# Patient Record
Sex: FEMALE | URBAN - METROPOLITAN AREA
[De-identification: names, ages, dates, MRNs, and addresses within clinical notes are randomized per-mention and may not be internally consistent; named-entity substitution may affect disease eponyms.]

---

## 2018-05-23 ENCOUNTER — APPOINTMENT (RX ONLY)
Dept: URBAN - METROPOLITAN AREA CLINIC 86 | Facility: CLINIC | Age: 43
Setting detail: DERMATOLOGY
End: 2018-05-23

## 2018-05-23 DIAGNOSIS — D22 MELANOCYTIC NEVI: ICD-10-CM

## 2018-05-23 DIAGNOSIS — L20.89 OTHER ATOPIC DERMATITIS: ICD-10-CM

## 2018-05-23 PROBLEM — D48.5 NEOPLASM OF UNCERTAIN BEHAVIOR OF SKIN: Status: ACTIVE | Noted: 2018-05-23

## 2018-05-23 PROBLEM — L20.84 INTRINSIC (ALLERGIC) ECZEMA: Status: ACTIVE | Noted: 2018-05-23

## 2018-05-23 PROCEDURE — 99203 OFFICE O/P NEW LOW 30 MIN: CPT | Mod: 25

## 2018-05-23 PROCEDURE — ? PRESCRIPTION

## 2018-05-23 PROCEDURE — ? SHAVE REMOVAL

## 2018-05-23 PROCEDURE — ? COUNSELING

## 2018-05-23 PROCEDURE — 11306 SHAVE SKIN LESION 0.6-1.0 CM: CPT

## 2018-05-23 RX ORDER — CLOBETASOL PROPIONATE 0.5 MG/G
AEROSOL, FOAM TOPICAL BID
Qty: 1 | Refills: 0 | Status: ERX | COMMUNITY
Start: 2018-05-23

## 2018-05-23 RX ADMIN — CLOBETASOL PROPIONATE: 0.5 AEROSOL, FOAM TOPICAL at 20:55

## 2018-05-23 ASSESSMENT — LOCATION SIMPLE DESCRIPTION DERM: LOCATION SIMPLE: LEFT ANTERIOR NECK

## 2018-05-23 ASSESSMENT — LOCATION DETAILED DESCRIPTION DERM: LOCATION DETAILED: LEFT INFERIOR ANTERIOR NECK

## 2018-05-23 ASSESSMENT — LOCATION ZONE DERM: LOCATION ZONE: NECK

## 2018-05-23 NOTE — PROCEDURE: SHAVE REMOVAL
Consent was obtained from the patient. The risks and benefits to therapy were discussed in detail. Specifically, the risks of infection, scarring, bleeding, prolonged wound healing, incomplete removal, allergy to anesthesia, nerve injury and recurrence were addressed. Prior to the procedure, the treatment site was clearly identified and confirmed by the patient. All components of Universal Protocol/PAUSE Rule completed.
Bill 11172 For Specimen Handling/Conveyance To Laboratory?: no
Notification Instructions: Patient will be notified of biopsy results. However, patient instructed to call the office if not contacted within 2 weeks.
Anesthesia Type: 1% lidocaine with epinephrine
Size Of Lesion In Cm (Required): 0.6
Render Post-Care Instructions In Note?: yes
X Size Of Lesion In Cm (Optional): 0
Medical Necessity Information: It is in your best interest to select a reason for this procedure from the list below. All of these items fulfill various CMS LCD requirements except the new and changing color options.
Path Notes (To The Dermatopathologist): Size: 0.6
Lab: [id number]  [facility]
Size Of Margin In Cm (Margins Are Not Added To Billing Dimensions): -
Billing Type: [employer]
Lab Facility:  [facility]
Biopsy Method: Personna blade
Post-Care Instructions: I reviewed with the patient in detail post-care instructions. Patient is to keep the biopsy site dry overnight, and then apply bacitracin twice daily until healed.
Hemostasis: Electrocautery
Detail Level: Detailed
Medical Necessity Clause: This procedure was medically necessary because the lesion that was treated was:
Wound Care: Bacitracin
Anesthesia Volume In Cc: 0.3
Body Location Override (Optional - Billing Will Still Be Based On Selected Body Map Location If Applicable): central neck

## 2018-05-23 NOTE — PROCEDURE: MIPS QUALITY
Detail Level: Zone
Quality 431: Preventive Care And Screening: Unhealthy Alcohol Use - Screening: Patient screened for unhealthy alcohol use using a single question and scores less than 2 times per year
Quality 131: Pain Assessment And Follow-Up: No documentation of pain assessment, reason not given
Quality 110: Preventive Care And Screening: Influenza Immunization: Influenza immunization was not ordered or administered, reason not given
Quality 47: Advance Care Plan: Advance care planning not documented, reason not otherwise specified.
Quality 130: Documentation Of Current Medications In The Medical Record: Current Medications Documented
Quality 226: Preventive Care And Screening: Tobacco Use: Screening And Cessation Intervention: Patient screened for tobacco and never smoked

## 2020-11-12 ENCOUNTER — APPOINTMENT (RX ONLY)
Dept: URBAN - METROPOLITAN AREA CLINIC 55 | Facility: CLINIC | Age: 45
Setting detail: DERMATOLOGY
End: 2020-11-12

## 2020-11-12 DIAGNOSIS — Z41.9 ENCOUNTER FOR PROCEDURE FOR PURPOSES OTHER THAN REMEDYING HEALTH STATE, UNSPECIFIED: ICD-10-CM

## 2020-11-12 PROCEDURE — ? DYSPORT

## 2020-11-12 NOTE — PROCEDURE: DYSPORT
Show Topical Anesthesia: Yes
Consent: Written consent obtained. Risks include but not limited to lid/brow ptosis, bruising, swelling, diplopia, temporary effect, incomplete chemical denervation.
Post-Care Instructions: Patient instructed to not lie down for 4 hours and limit physical activity for 24 hours. Follow up 7 days if needed.
Right Periorbital Skin Units: 0
Dilution (U/0.1 Cc): 10
Show Right And Left Brow Units: No
Price (Use Numbers Only, No Special Characters Or $): 384
Lot #: [id number]
Additional Area 1 Location: lip
Detail Level: Detailed
Periorbital Skin Units: 30
Glabellar Complex Units: 25
Expiration Date (Month Year): 6/30/2021
Forehead Units: 17.5

## 2021-04-01 ENCOUNTER — APPOINTMENT (RX ONLY)
Dept: URBAN - METROPOLITAN AREA CLINIC 55 | Facility: CLINIC | Age: 46
Setting detail: DERMATOLOGY
End: 2021-04-01

## 2021-04-01 DIAGNOSIS — Z41.9 ENCOUNTER FOR PROCEDURE FOR PURPOSES OTHER THAN REMEDYING HEALTH STATE, UNSPECIFIED: ICD-10-CM

## 2021-04-01 PROCEDURE — ? DYSPORT

## 2021-04-01 NOTE — PROCEDURE: DYSPORT
Show Mentalis Units: No
Additional Area 4 Units: 0
Expiration Date (Month Year): 11/30/2021
Show Masseter Units: Yes
Glabellar Complex Units: 25
Detail Level: Detailed
Post-Care Instructions: Patient instructed to not lie down for 4 hours and limit physical activity for 24 hours. Follow up 7 days if needed.
Additional Area 1 Location: lip
Forehead Units: 20
Periorbital Skin Units: 30
Lot #: [id number]
Consent: Written consent obtained. Risks include but not limited to lid/brow ptosis, bruising, swelling, diplopia, temporary effect, incomplete chemical denervation.
Dilution (U/0.1 Cc): 10

## 2021-07-14 ENCOUNTER — APPOINTMENT (RX ONLY)
Dept: URBAN - METROPOLITAN AREA CLINIC 55 | Facility: CLINIC | Age: 46
Setting detail: DERMATOLOGY
End: 2021-07-14

## 2021-07-14 DIAGNOSIS — Z41.9 ENCOUNTER FOR PROCEDURE FOR PURPOSES OTHER THAN REMEDYING HEALTH STATE, UNSPECIFIED: ICD-10-CM

## 2021-07-14 PROCEDURE — ? DYSPORT

## 2021-07-14 NOTE — PROCEDURE: DYSPORT
Glabellar Complex Units: 25
Show Right And Left Periorbital Units: No
Levator Labii Superioris Units: 0
Price (Use Numbers Only, No Special Characters Or $): 240
Show Levator Superior Units: Yes
Periorbital Skin Units: 30
Post-Care Instructions: Patient instructed to not lie down for 4 hours and limit physical activity for 24 hours. Follow up 7 days if needed.
Consent: Written consent obtained. Risks include but not limited to lid/brow ptosis, bruising, swelling, diplopia, temporary effect, incomplete chemical denervation.
Forehead Units: 20
Dilution (U/0.1 Cc): 10
Expiration Date (Month Year): 9/30/2021
Additional Area 1 Location: lip
Lot #: [id number]
Detail Level: Detailed

## 2021-08-05 ENCOUNTER — APPOINTMENT (RX ONLY)
Dept: URBAN - METROPOLITAN AREA CLINIC 55 | Facility: CLINIC | Age: 46
Setting detail: DERMATOLOGY
End: 2021-08-05

## 2021-08-05 DIAGNOSIS — Z41.9 ENCOUNTER FOR PROCEDURE FOR PURPOSES OTHER THAN REMEDYING HEALTH STATE, UNSPECIFIED: ICD-10-CM

## 2021-08-05 PROCEDURE — ? DYSPORT

## 2021-08-05 PROCEDURE — ? FILLERS

## 2021-08-05 ASSESSMENT — LOCATION ZONE DERM: LOCATION ZONE: FACE

## 2021-08-05 ASSESSMENT — LOCATION SIMPLE DESCRIPTION DERM: LOCATION SIMPLE: RIGHT CHEEK

## 2021-08-05 ASSESSMENT — LOCATION DETAILED DESCRIPTION DERM: LOCATION DETAILED: RIGHT SUPERIOR CENTRAL MALAR CHEEK

## 2021-08-05 NOTE — PROCEDURE: FILLERS
Cheeks Filler Volume In Cc: 0
Anesthesia Volume In Cc: 0.3
Filler: Restylane-L
Expiration Date (Month Year): 01/31/2024
Anesthesia Type: 1% lidocaine with epinephrine
Lot #: [id number]
Include Cannula Information In Note?: No
Additional Anesthesia Volume In Cc: 6
Consent: Written consent obtained. Risks include but not limited to bruising, beading, irregular texture, ulceration, infection, allergic reaction, scar formation, incomplete augmentation, temporary nature, procedural pain.
Post-Care Instructions: Patient instructed to apply ice to reduce swelling. Post care handout given to patient.
Expiration Date (Month Year): 10/31/2023
Price (Use Numbers Only, No Special Characters Or $): 106
Filler Comments: .5cc used.
Lot #: [id number]
Map Statment: See Attach Map for Complete Details
Include Cannula Size?: 27G
Expiration Date (Month Year): 10/31/2022
Lot #: [id number]
Detail Level: Detailed

## 2021-08-05 NOTE — PROCEDURE: DYSPORT
Dilution (U/0.1 Cc): 10
Consent: Written consent obtained. Risks include but not limited to lid/brow ptosis, bruising, swelling, diplopia, temporary effect, incomplete chemical denervation.
Show Nasal Units: Yes
Lateral Platysmal Bands Units: 0
Additional Area 1 Units: 5
Lot #: [id number]
Show Mentalis Units: No
Forehead Units: 15
Expiration Date (Month Year): 9/30/2021
Detail Level: Detailed
Post-Care Instructions: Patient instructed to not lie down for 4 hours and limit physical activity for 24 hours. Follow up 7 days if needed.
Additional Area 1 Location: under eyes
Price (Use Numbers Only, No Special Characters Or $): 240

## 2021-11-17 ENCOUNTER — APPOINTMENT (RX ONLY)
Dept: URBAN - METROPOLITAN AREA CLINIC 55 | Facility: CLINIC | Age: 46
Setting detail: DERMATOLOGY
End: 2021-11-17

## 2021-11-17 DIAGNOSIS — Z41.9 ENCOUNTER FOR PROCEDURE FOR PURPOSES OTHER THAN REMEDYING HEALTH STATE, UNSPECIFIED: ICD-10-CM

## 2021-11-17 PROCEDURE — ? DYSPORT

## 2021-11-17 NOTE — PROCEDURE: DYSPORT
Masseter Units: 0
Show Nasal Units: Yes
Post-Care Instructions: Patient instructed to not lie down for 4 hours and limit physical activity for 24 hours. Follow up 7 days if needed.
Show Right And Left Periorbital Units: No
Dilution (U/0.1 Cc): 10
Detail Level: Detailed
Price (Use Numbers Only, No Special Characters Or $): 408
Lot #: [id number]
Glabellar Complex Units: 25
Periorbital Skin Units: 30
Additional Area 1 Location: lip
Expiration Date (Month Year): 7/31/2022
Consent: Written consent obtained. Risks include but not limited to lid/brow ptosis, bruising, swelling, diplopia, temporary effect, incomplete chemical denervation.
no

## 2022-03-03 ENCOUNTER — APPOINTMENT (RX ONLY)
Dept: URBAN - METROPOLITAN AREA CLINIC 55 | Facility: CLINIC | Age: 47
Setting detail: DERMATOLOGY
End: 2022-03-03

## 2022-03-03 DIAGNOSIS — Z41.9 ENCOUNTER FOR PROCEDURE FOR PURPOSES OTHER THAN REMEDYING HEALTH STATE, UNSPECIFIED: ICD-10-CM

## 2022-03-03 PROCEDURE — ? DYSPORT

## 2022-03-03 NOTE — PROCEDURE: DYSPORT
Right Pupillary Line Units: 0
Dilution (U/0.1 Cc): 10
Show Orbicularis Oculi Units: Yes
Consent: Written consent obtained. Risks include but not limited to lid/brow ptosis, bruising, swelling, diplopia, temporary effect, incomplete chemical denervation.
Post-Care Instructions: Patient instructed to not lie down for 4 hours and limit physical activity for 24 hours. Follow up 7 days if needed.
Show Lcl Units: No
Additional Area 1 Location: lip
Detail Level: Detailed
Forehead Units: 35
Lot #: [id number]
Glabellar Complex Units: 25
Expiration Date (Month Year): 8/30/2022
Periorbital Skin Units: 30

## 2022-06-29 ENCOUNTER — APPOINTMENT (RX ONLY)
Dept: URBAN - METROPOLITAN AREA CLINIC 55 | Facility: CLINIC | Age: 47
Setting detail: DERMATOLOGY
End: 2022-06-29

## 2022-06-29 DIAGNOSIS — Z41.9 ENCOUNTER FOR PROCEDURE FOR PURPOSES OTHER THAN REMEDYING HEALTH STATE, UNSPECIFIED: ICD-10-CM

## 2022-06-29 PROCEDURE — ? DYSPORT

## 2022-06-29 NOTE — PROCEDURE: DYSPORT
Show Additional Area 4: Yes
Show Right And Left Periorbital Units: No
Inferior Lateral Orbicularis Oculi Units: 0
Detail Level: Detailed
Post-Care Instructions: Patient instructed to not lie down for 4 hours and limit physical activity for 24 hours. Follow up 7 days if needed.
Dilution (U/0.1 Cc): 10
Consent: Written consent obtained. Risks include but not limited to lid/brow ptosis, bruising, swelling, diplopia, temporary effect, incomplete chemical denervation.
Lot #: [id number]
Glabellar Complex Units: 25
Expiration Date (Month Year): 2/28/2023
Forehead Units: 40
Additional Area 1 Location: lip
Periorbital Skin Units: 30

## 2022-11-10 ENCOUNTER — APPOINTMENT (RX ONLY)
Dept: URBAN - METROPOLITAN AREA CLINIC 55 | Facility: CLINIC | Age: 47
Setting detail: DERMATOLOGY
End: 2022-11-10

## 2022-11-10 DIAGNOSIS — Z41.9 ENCOUNTER FOR PROCEDURE FOR PURPOSES OTHER THAN REMEDYING HEALTH STATE, UNSPECIFIED: ICD-10-CM

## 2022-11-10 PROCEDURE — ? DYSPORT

## 2022-11-10 NOTE — PROCEDURE: DYSPORT
Show Masseter Units: Yes
Lcl Root Units: 0
Post-Care Instructions: Patient instructed to not lie down flat for 4 hours or rub the treatment area.
Show Right And Left Periorbital Units: No
Glabellar Complex Units: 25
Detail Level: Detailed
Additional Area 1 Location: upper cutaneous
Forehead Units: 40
Show Inventory Tab: Show
Dilution (U/0.1 Cc): 10
Periorbital Skin Units: 30
Consent obtained and risk reviewed.

## 2023-06-14 ENCOUNTER — APPOINTMENT (RX ONLY)
Dept: URBAN - METROPOLITAN AREA CLINIC 55 | Facility: CLINIC | Age: 48
Setting detail: DERMATOLOGY
End: 2023-06-14

## 2023-06-14 DIAGNOSIS — Z41.9 ENCOUNTER FOR PROCEDURE FOR PURPOSES OTHER THAN REMEDYING HEALTH STATE, UNSPECIFIED: ICD-10-CM

## 2023-06-14 PROCEDURE — ? DYSPORT

## 2023-06-14 NOTE — PROCEDURE: DYSPORT
L Brow Units: 0
Show Periorbital Units: Yes
Additional Area 2 Location: [facility]
Show Right And Left Periorbital Units: No
Post-Care Instructions: Patient instructed to not lie down flat for 4 hours or rub the treatment area.
Consent obtained and risk reviewed.
Show Inventory Tab: Show
Periorbital Skin Units: 30
Dilution (U/0.1 Cc): 10
Glabellar Complex Units: 40
Additional Area 1 Location: upper cutaneous
Forehead Units: 35
Detail Level: Detailed

## 2023-11-07 ENCOUNTER — APPOINTMENT (RX ONLY)
Dept: URBAN - METROPOLITAN AREA CLINIC 55 | Facility: CLINIC | Age: 48
Setting detail: DERMATOLOGY
End: 2023-11-07

## 2023-11-07 DIAGNOSIS — Z41.9 ENCOUNTER FOR PROCEDURE FOR PURPOSES OTHER THAN REMEDYING HEALTH STATE, UNSPECIFIED: ICD-10-CM

## 2023-11-07 PROCEDURE — ? DYSPORT

## 2023-11-07 NOTE — PROCEDURE: DYSPORT
Show Ucl Units: No
Left Periorbital Skin Units: 0
Show Forehead Units: Yes
Forehead Units: 35
Additional Area 1 Location: upper cutaneous
Glabellar Complex Units: 40
Detail Level: Detailed
Additional Area 2 Location: chin
Consent obtained and risk reviewed.
Show Inventory Tab: Show
Post-Care Instructions: Patient instructed to not lie down flat for 4 hours or rub the treatment area.
Periorbital Skin Units: 30
Dilution (U/0.1 Cc): 10

## 2024-02-22 ENCOUNTER — APPOINTMENT (RX ONLY)
Dept: URBAN - METROPOLITAN AREA CLINIC 55 | Facility: CLINIC | Age: 49
Setting detail: DERMATOLOGY
End: 2024-02-22

## 2024-02-22 DIAGNOSIS — Z41.9 ENCOUNTER FOR PROCEDURE FOR PURPOSES OTHER THAN REMEDYING HEALTH STATE, UNSPECIFIED: ICD-10-CM

## 2024-02-22 PROCEDURE — ? DYSPORT

## 2024-02-22 NOTE — PROCEDURE: DYSPORT
Show Nasal Units: Yes
Additional Area 1 Units: 0
Show Inventory Tab: Show
Additional Area 2 Location: chin
Consent obtained and risk reviewed.
Show Ucl Units: No
Post-Care Instructions: Patient instructed to not lie down flat for 4 hours or rub the treatment area.
Periorbital Skin Units: 40
Dilution (U/0.1 Cc): 10
Forehead Units: 35
Additional Area 1 Location: upper cutaneous
Detail Level: Detailed

## 2024-06-13 ENCOUNTER — APPOINTMENT (RX ONLY)
Dept: URBAN - METROPOLITAN AREA CLINIC 55 | Facility: CLINIC | Age: 49
Setting detail: DERMATOLOGY
End: 2024-06-13

## 2024-06-13 DIAGNOSIS — Z41.9 ENCOUNTER FOR PROCEDURE FOR PURPOSES OTHER THAN REMEDYING HEALTH STATE, UNSPECIFIED: ICD-10-CM

## 2024-06-13 PROCEDURE — ? DYSPORT

## 2024-06-13 NOTE — PROCEDURE: DYSPORT
Mentalis Units: 0
Periorbital Skin Units: 30
Show Additional Area 3: Yes
Additional Area 2 Location: chin
Show Ucl Units: No
Consent obtained and risk reviewed.
Post-Care Instructions: Patient instructed to not lie down flat for 4 hours or rub the treatment area.
Forehead Units: 35
Dilution (U/0.1 Cc): 10
Detail Level: Detailed
Additional Area 1 Location: upper cutaneous
Glabellar Complex Units: 40
Show Inventory Tab: Show

## 2024-10-09 ENCOUNTER — APPOINTMENT (RX ONLY)
Dept: URBAN - METROPOLITAN AREA CLINIC 55 | Facility: CLINIC | Age: 49
Setting detail: DERMATOLOGY
End: 2024-10-09

## 2024-10-09 DIAGNOSIS — Z41.9 ENCOUNTER FOR PROCEDURE FOR PURPOSES OTHER THAN REMEDYING HEALTH STATE, UNSPECIFIED: ICD-10-CM

## 2024-10-09 PROCEDURE — ? DYSPORT

## 2024-12-12 ENCOUNTER — APPOINTMENT (OUTPATIENT)
Dept: URBAN - METROPOLITAN AREA CLINIC 55 | Facility: CLINIC | Age: 49
Setting detail: DERMATOLOGY
End: 2024-12-12

## 2024-12-12 DIAGNOSIS — Z41.9 ENCOUNTER FOR PROCEDURE FOR PURPOSES OTHER THAN REMEDYING HEALTH STATE, UNSPECIFIED: ICD-10-CM

## 2024-12-12 PROCEDURE — ? DYSPORT

## 2024-12-12 PROCEDURE — ? INVENTORY

## 2024-12-12 PROCEDURE — ? FILLERS

## 2024-12-12 NOTE — PROCEDURE: DYSPORT
Left Pupillary Line Units: 0
Show Orbicularis Oculi Units: Yes
Periorbital Skin Units: 40
Show Right And Left Pupillary Line Units: No
Consent obtained and risk reviewed.
Dilution (U/0.1 Cc): 10
Post-Care Instructions: Patient instructed to not lie down flat for 4 hours or rub the treatment area.
Glabellar Complex Units: 50
Detail Level: Detailed
Forehead Units: 35
Additional Area 1 Location: upper lip cutaneous
Show Inventory Tab: Show
Additional Area 2 Location: chin

## 2024-12-12 NOTE — PROCEDURE: FILLERS
[place] Filler Volume In Cc: 0
Include Cannula Length?: 1.5 inch
Consent obtained and risks reviewed.
Post-Care Instructions: Alastin post injection serum applied to injection sites post procedure. Patient instructed to apply ice to the treatment to reduce swelling.  Discussed to wait 2-3 weeks post injection to assess the outcome prior to  receiving any additional filler services.
Use Map Statement For Sites (Optional): Yes
Include Cannula Information In Note?: No
Filler: Restylane Kysse
Map Statment: See Attach Map for Complete Details
Aspiration Statement: Aspiration was performed prior to injecting site with filler.
Additional Comments: site was cleansed and prepped with 70% isopropyl alcohol
Anesthesia Type: 1% lidocaine with epinephrine
Anesthesia Volume In Cc: 0.2
Include Cannula Size?: 25G
Detail Level: Detailed

## 2025-04-02 ENCOUNTER — APPOINTMENT (OUTPATIENT)
Dept: URBAN - METROPOLITAN AREA CLINIC 55 | Facility: CLINIC | Age: 50
Setting detail: DERMATOLOGY
End: 2025-04-02

## 2025-04-02 DIAGNOSIS — Z41.9 ENCOUNTER FOR PROCEDURE FOR PURPOSES OTHER THAN REMEDYING HEALTH STATE, UNSPECIFIED: ICD-10-CM

## 2025-04-02 PROCEDURE — ? DYSPORT

## 2025-04-02 NOTE — PROCEDURE: DYSPORT
Show Additional Area 1: Yes
Right Periorbital Skin Units: 0
Show Ucl Units: No
Consent obtained and risk reviewed.
Post-Care Instructions: Patient instructed to not lie down flat for 4 hours or rub the treatment area.
Dilution (U/0.1 Cc): 10
Forehead Units: 40
Detail Level: Detailed
Additional Area 1 Location: upper lip cutaneous
Show Inventory Tab: Show
Additional Area 2 Location: chin
Periorbital Skin Units: 30

## 2025-08-19 ENCOUNTER — APPOINTMENT (OUTPATIENT)
Dept: URBAN - METROPOLITAN AREA CLINIC 55 | Facility: CLINIC | Age: 50
Setting detail: DERMATOLOGY
End: 2025-08-19

## 2025-08-19 DIAGNOSIS — Z41.9 ENCOUNTER FOR PROCEDURE FOR PURPOSES OTHER THAN REMEDYING HEALTH STATE, UNSPECIFIED: ICD-10-CM

## 2025-08-19 PROCEDURE — ? DYSPORT
